# Patient Record
Sex: MALE | Race: WHITE | NOT HISPANIC OR LATINO | Employment: OTHER | URBAN - METROPOLITAN AREA
[De-identification: names, ages, dates, MRNs, and addresses within clinical notes are randomized per-mention and may not be internally consistent; named-entity substitution may affect disease eponyms.]

---

## 2022-07-30 ENCOUNTER — HOSPITAL ENCOUNTER (EMERGENCY)
Facility: CLINIC | Age: 72
Discharge: HOME | End: 2022-07-30
Attending: EMERGENCY MEDICINE | Admitting: EMERGENCY MEDICINE
Payer: COMMERCIAL

## 2022-07-30 ENCOUNTER — APPOINTMENT (OUTPATIENT)
Dept: GENERAL RADIOLOGY | Facility: CLINIC | Age: 72
End: 2022-07-30
Attending: EMERGENCY MEDICINE
Payer: COMMERCIAL

## 2022-07-30 VITALS
HEART RATE: 77 BPM | TEMPERATURE: 98.2 F | SYSTOLIC BLOOD PRESSURE: 154 MMHG | DIASTOLIC BLOOD PRESSURE: 66 MMHG | OXYGEN SATURATION: 97 %

## 2022-07-30 DIAGNOSIS — U07.1 INFECTION DUE TO 2019 NOVEL CORONAVIRUS: ICD-10-CM

## 2022-07-30 LAB
ALBUMIN SERPL-MCNC: 3.5 G/DL (ref 3.4–5)
ALP SERPL-CCNC: 84 U/L (ref 40–150)
ALT SERPL W P-5'-P-CCNC: 23 U/L (ref 0–70)
ANION GAP SERPL CALCULATED.3IONS-SCNC: 4 MMOL/L (ref 3–14)
AST SERPL W P-5'-P-CCNC: 14 U/L (ref 0–45)
ATRIAL RATE - MUSE: 277 BPM
BASOPHILS # BLD AUTO: 0 10E3/UL (ref 0–0.2)
BASOPHILS NFR BLD AUTO: 0 %
BILIRUB SERPL-MCNC: 0.7 MG/DL (ref 0.2–1.3)
BUN SERPL-MCNC: 19 MG/DL (ref 7–30)
CALCIUM SERPL-MCNC: 8.8 MG/DL (ref 8.5–10.1)
CHLORIDE BLD-SCNC: 104 MMOL/L (ref 94–109)
CO2 SERPL-SCNC: 28 MMOL/L (ref 20–32)
CREAT SERPL-MCNC: 0.79 MG/DL (ref 0.66–1.25)
DIASTOLIC BLOOD PRESSURE - MUSE: NORMAL MMHG
EOSINOPHIL # BLD AUTO: 0.1 10E3/UL (ref 0–0.7)
EOSINOPHIL NFR BLD AUTO: 1 %
ERYTHROCYTE [DISTWIDTH] IN BLOOD BY AUTOMATED COUNT: 12.5 % (ref 10–15)
GFR SERPL CREATININE-BSD FRML MDRD: >90 ML/MIN/1.73M2
GLUCOSE BLD-MCNC: 143 MG/DL (ref 70–99)
HCT VFR BLD AUTO: 34.4 % (ref 40–53)
HGB BLD-MCNC: 11.9 G/DL (ref 13.3–17.7)
IMM GRANULOCYTES # BLD: 0 10E3/UL
IMM GRANULOCYTES NFR BLD: 0 %
INTERPRETATION ECG - MUSE: NORMAL
LYMPHOCYTES # BLD AUTO: 1.3 10E3/UL (ref 0.8–5.3)
LYMPHOCYTES NFR BLD AUTO: 20 %
MCH RBC QN AUTO: 32.6 PG (ref 26.5–33)
MCHC RBC AUTO-ENTMCNC: 34.6 G/DL (ref 31.5–36.5)
MCV RBC AUTO: 94 FL (ref 78–100)
MONOCYTES # BLD AUTO: 0.9 10E3/UL (ref 0–1.3)
MONOCYTES NFR BLD AUTO: 13 %
NEUTROPHILS # BLD AUTO: 4.4 10E3/UL (ref 1.6–8.3)
NEUTROPHILS NFR BLD AUTO: 66 %
NRBC # BLD AUTO: 0 10E3/UL
NRBC BLD AUTO-RTO: 0 /100
P AXIS - MUSE: NORMAL DEGREES
PLATELET # BLD AUTO: 122 10E3/UL (ref 150–450)
POTASSIUM BLD-SCNC: 3.4 MMOL/L (ref 3.4–5.3)
PR INTERVAL - MUSE: NORMAL MS
PROT SERPL-MCNC: 6.6 G/DL (ref 6.8–8.8)
QRS DURATION - MUSE: 108 MS
QT - MUSE: 380 MS
QTC - MUSE: 435 MS
R AXIS - MUSE: 21 DEGREES
RBC # BLD AUTO: 3.65 10E6/UL (ref 4.4–5.9)
SODIUM SERPL-SCNC: 136 MMOL/L (ref 133–144)
SYSTOLIC BLOOD PRESSURE - MUSE: NORMAL MMHG
T AXIS - MUSE: 0 DEGREES
VENTRICULAR RATE- MUSE: 79 BPM
WBC # BLD AUTO: 6.7 10E3/UL (ref 4–11)

## 2022-07-30 PROCEDURE — 71045 X-RAY EXAM CHEST 1 VIEW: CPT

## 2022-07-30 PROCEDURE — 82947 ASSAY GLUCOSE BLOOD QUANT: CPT | Performed by: EMERGENCY MEDICINE

## 2022-07-30 PROCEDURE — 99285 EMERGENCY DEPT VISIT HI MDM: CPT | Mod: 25

## 2022-07-30 PROCEDURE — 36415 COLL VENOUS BLD VENIPUNCTURE: CPT | Performed by: EMERGENCY MEDICINE

## 2022-07-30 PROCEDURE — 85025 COMPLETE CBC W/AUTO DIFF WBC: CPT | Performed by: EMERGENCY MEDICINE

## 2022-07-30 PROCEDURE — 93005 ELECTROCARDIOGRAM TRACING: CPT

## 2022-07-30 ASSESSMENT — ENCOUNTER SYMPTOMS
MYALGIAS: 1
SHORTNESS OF BREATH: 1
COUGH: 1
NAUSEA: 0
SORE THROAT: 1
DIARRHEA: 0
FATIGUE: 1

## 2022-07-30 NOTE — ED PROVIDER NOTES
History   Chief Complaint:  Suspected Covid       HPI   Cameron Nelson is a 72 year old male with history of afib anticoagulated by Eliquis, hypertension, high cholesterol who presents with COVID symptoms. Since yesterday, patient developed dry throat, sore throat, cough, shortness of breath, fatigue, generalized body ache. Home COVID test was positive today at noon. His oximeter showed 94% at home. He denies any anosmia, diarrhea, rash, nausea, vomiting, headache, or any other concerns. He was COVID vaccinated and boosted x2.    He lives in [place] and is visiting his son's family for his grandson's birthday. The 10-month old was having COVID and was recently tested negative. Patient takes Eliquis for his afib. He also reports history of incomplete right bundle branch block. No history of MI or stents. He is on medication for hypertension and high cholesterol. No kidney issues or diabetes. He had asthma as a kid but has not had asthma ever since.     Review of Systems   Constitutional: Positive for fatigue.   HENT: Positive for sore throat.    Respiratory: Positive for cough and shortness of breath.    Gastrointestinal: Negative for diarrhea and nausea.   Musculoskeletal: Positive for myalgias.   Skin: Negative for rash.   All other systems reviewed and are negative.    Allergies:  No Known Allergies    Medications:  Propranolol  Eliquis  Hypertension med  High cholesterol med    Past Medical History:     Hypertension  High cholesterol  afib  Incomplete right bundle right block     Past Surgical History:    No past surgical history on file.     Family History:    No family history on file.    Social History:  Presents alone.    Physical Exam     Patient Vitals for the past 24 hrs:   BP Temp Pulse SpO2   07/30/22 1800 (!) 154/66 98.2  F (36.8  C) 77 97 %     Physical Exam  SKIN:  Warm, dry.  HEMATOLOGIC/IMMUNOLOGIC/LYMPHATIC:  No pallor.  No lower limb edema.  HENT: No JVD.  EYES:  Conjunctivae  normal.  CARDIOVASCULAR:  Regular rate with irregularly irregular rhythm.  No murmur.  No rub.  RESPIRATORY:  No respiratory distress, breath sounds equal and normal.  Respiratory rate is 16.  GASTROINTESTINAL:  Soft, nontender abdomen.  MUSCULOSKELETAL: Normal body habitus.  NEUROLOGIC:  Alert, conversant.  PSYCHIATRIC:  Normal mood.    Emergency Department Course   ECG  ECG taken at 1826, ECG read at 1827   Afib with a competing junctional pacemaker RSR' or QR pattern in V1 suggests right ventricular conduction delay.  Rate 79 bpm. NJ interval * ms. QRS duration 108 ms. QT/QTc 380/435 ms. P-R-T axes * 21 0.     Imaging:  XR Chest Port 1 View   Final Result   IMPRESSION: Mild cardiomegaly with normal pulmonary vascularity. Advanced hypertrophic changes in the AC joints and the spine. Partially calcified thoracic aorta. The chest is otherwise negative with no obvious infiltrates/atelectasis or findings of    active CHF.        Report per radiology    Laboratory:  Labs Ordered and Resulted from Time of ED Arrival to Time of ED Departure   COMPREHENSIVE METABOLIC PANEL - Abnormal       Result Value    Sodium 136      Potassium 3.4      Chloride 104      Carbon Dioxide (CO2) 28      Anion Gap 4      Urea Nitrogen 19      Creatinine 0.79      Calcium 8.8      Glucose 143 (*)     Alkaline Phosphatase 84      AST 14      ALT 23      Protein Total 6.6 (*)     Albumin 3.5      Bilirubin Total 0.7      GFR Estimate >90     CBC WITH PLATELETS AND DIFFERENTIAL - Abnormal    WBC Count 6.7      RBC Count 3.65 (*)     Hemoglobin 11.9 (*)     Hematocrit 34.4 (*)     MCV 94      MCH 32.6      MCHC 34.6      RDW 12.5      Platelet Count 122 (*)     % Neutrophils 66      % Lymphocytes 20      % Monocytes 13      % Eosinophils 1      % Basophils 0      % Immature Granulocytes 0      NRBCs per 100 WBC 0      Absolute Neutrophils 4.4      Absolute Lymphocytes 1.3      Absolute Monocytes 0.9      Absolute Eosinophils 0.1      Absolute  Basophils 0.0      Absolute Immature Granulocytes 0.0      Absolute NRBCs 0.0        Emergency Department Course:     Reviewed:  I reviewed nursing notes and vitals    Assessments:  1802 I obtained history and examined the patient as noted above.   1926 I rechecked the patient and explained findings.     Disposition:  The patient was discharged to home.     Impression & Plan     Medical Decision Making:  This patient presents after testing positive for COVID-19 using a home test.  Symptoms certainly consistent with COVID-19.  Clinically well-appearing with reassuring vital signs particularly his oxygenation.  Given his age and health history and being positive for COVID-19 testing performed as above which was reassuring.  The patient feels well enough for outpatient management.  I think the patient may benefit from taking a course of Paxlovid.  I reviewed his medications with him and advised to half his dose of Eliquis and amlodipine for 8 days in addition to discontinuing his a atorvastatin for 8 days.  Given the patient is anticoagulated I doubt pulmonary embolism complicating his COVID-19.  I encouraged him to return to the ED if feeling worse in any way.    Diagnosis:    ICD-10-CM    1. Infection due to 2019 novel coronavirus  U07.1        Discharge Medications:  Discharge Medication List as of 7/30/2022  7:40 PM      START taking these medications    Details   nirmatrelvir and ritonavir (PAXLOVID) therapy pack Take 3 tablets by mouth 2 times daily for 5 days, Disp-30 tablet, R-0, E-PrescribeDate of symptom onset: 7/29/22; Risk criteria met: Yes; Positive Covid-19 test: Yes; Weight >40 kg Yes; Renal fxn: normal;  Drug-Drug interactions reviewed & addressed: Miguel vargas             Scribe Disclosure:  I, Crissy Kong, am serving as a scribe at 6:02 PM on 7/30/2022 to document services personally performed by Reji Nielson MD based on my observations and the provider's statements to me.          Reji Nielson,  MD  07/30/22 1630       Reji Nielson MD  07/30/22 0144